# Patient Record
Sex: MALE | Race: WHITE | NOT HISPANIC OR LATINO | Employment: OTHER | URBAN - METROPOLITAN AREA
[De-identification: names, ages, dates, MRNs, and addresses within clinical notes are randomized per-mention and may not be internally consistent; named-entity substitution may affect disease eponyms.]

---

## 2024-06-14 ENCOUNTER — APPOINTMENT (OUTPATIENT)
Dept: PRIMARY CARE | Facility: CLINIC | Age: 89
End: 2024-06-14
Payer: MEDICARE

## 2024-06-14 VITALS — SYSTOLIC BLOOD PRESSURE: 135 MMHG | HEART RATE: 94 BPM | RESPIRATION RATE: 16 BRPM | DIASTOLIC BLOOD PRESSURE: 77 MMHG

## 2024-06-14 DIAGNOSIS — Z23 NEED FOR VACCINATION WITH 20-POLYVALENT PNEUMOCOCCAL CONJUGATE VACCINE: ICD-10-CM

## 2024-06-14 DIAGNOSIS — I10 HYPERTENSION, UNSPECIFIED TYPE: ICD-10-CM

## 2024-06-14 DIAGNOSIS — I50.9 HEART FAILURE, UNSPECIFIED HF CHRONICITY, UNSPECIFIED HEART FAILURE TYPE (MULTI): ICD-10-CM

## 2024-06-14 DIAGNOSIS — Z00.00 ENCOUNTER FOR ANNUAL WELLNESS VISIT (AWV) IN MEDICARE PATIENT: Primary | ICD-10-CM

## 2024-06-14 DIAGNOSIS — I48.0 PAROXYSMAL ATRIAL FIBRILLATION (MULTI): ICD-10-CM

## 2024-06-14 DIAGNOSIS — E78.5 HYPERLIPIDEMIA, UNSPECIFIED HYPERLIPIDEMIA TYPE: ICD-10-CM

## 2024-06-14 RX ORDER — FUROSEMIDE 20 MG/1
20 TABLET ORAL
COMMUNITY
Start: 2023-11-01

## 2024-06-14 RX ORDER — TRAZODONE HYDROCHLORIDE 50 MG/1
50 TABLET ORAL
COMMUNITY
Start: 2023-09-15 | End: 2024-06-15 | Stop reason: WASHOUT

## 2024-06-14 RX ORDER — DOXAZOSIN 4 MG/1
4 TABLET ORAL
COMMUNITY
Start: 2023-05-20

## 2024-06-14 RX ORDER — SIMVASTATIN 20 MG/1
20 TABLET, FILM COATED ORAL
COMMUNITY
Start: 2023-08-10 | End: 2024-06-15 | Stop reason: WASHOUT

## 2024-06-14 RX ORDER — GUAIFENESIN 400 MG/1
400 TABLET ORAL EVERY 6 HOURS PRN
COMMUNITY
Start: 2023-04-04

## 2024-06-14 RX ORDER — ATENOLOL 50 MG/1
50 TABLET ORAL
COMMUNITY
Start: 2023-08-11

## 2024-06-14 RX ORDER — DIGOXIN 125 MCG
0.12 TABLET ORAL
COMMUNITY
Start: 2023-11-01

## 2024-06-14 ASSESSMENT — PATIENT HEALTH QUESTIONNAIRE - PHQ9
1. LITTLE INTEREST OR PLEASURE IN DOING THINGS: NOT AT ALL
2. FEELING DOWN, DEPRESSED OR HOPELESS: NOT AT ALL
SUM OF ALL RESPONSES TO PHQ9 QUESTIONS 1 AND 2: 0

## 2024-06-14 ASSESSMENT — ENCOUNTER SYMPTOMS
OCCASIONAL FEELINGS OF UNSTEADINESS: 0
LOSS OF SENSATION IN FEET: 0
DEPRESSION: 0

## 2024-06-14 ASSESSMENT — ACTIVITIES OF DAILY LIVING (ADL)
DOING_HOUSEWORK: NEEDS ASSISTANCE
DRESSING: NEEDS ASSISTANCE
MANAGING_FINANCES: INDEPENDENT
GROCERY_SHOPPING: NEEDS ASSISTANCE
TAKING_MEDICATION: NEEDS ASSISTANCE
BATHING: NEEDS ASSISTANCE

## 2024-06-14 NOTE — PROGRESS NOTES
Subjective   Patient ID: Dhruv Valdes is a 89 y.o. male who presents for Medicare Annual Wellness Visit Subsequent (ASKING FOR IMMUNIZATIONS ).  [place]   [facility]   Presenting today for AWV   He is in a wheel chair and was brought in by [facility]    He reported that he recently had comprehensive blood workup at the [facility].   [facility] .     Relocated to the [facility] and has been followed by a provider over there.     He had an admission with mentione of Pafib that was back in 09/2023 and since then he has been following with another provider at the [facility] , we do not have documentation and she was seen by primary care with [facility] [place] since then.   There were also mention of heart failure, no documents available.     He is feeling well.   His borther is his emergency contact.       patient is feeling well   he is not worried about much   lives alone   no family around   he is a reader , write letters   have exercise that he tries to follow regulary   he eats healthy   he is DNR , when time comes he wants natural death.         Review of Systems    History reviewed. No pertinent past medical history.    History reviewed. No pertinent surgical history.   Social History     Socioeconomic History    Marital status:      Spouse name: None    Number of children: None    Years of education: None    Highest education level: None   Occupational History    None   Tobacco Use    Smoking status: Former     Types: Cigarettes    Smokeless tobacco: Never   Substance and Sexual Activity    Alcohol use: Not Currently    Drug use: Not Currently    Sexual activity: None   Other Topics Concern    None   Social History Narrative    None     Social Determinants of Health     Financial Resource Strain: Not on file   Food Insecurity: Not on file   Transportation Needs: Not on file   Physical Activity: Not  on file   Stress: Not on file   Social Connections: Not on file   Intimate Partner Violence: Not on file   Housing Stability: Not on file      Family History   Problem Relation Name Age of Onset    No Known Problems Mother      No Known Problems Father         MEDICATIONS AND ALLERGIES:    ALLERGIES Patient has no known allergies.    MEDICATIONS   Current Outpatient Medications on File Prior to Visit   Medication Sig Dispense Refill    atenolol (Tenormin) 50 mg tablet Take 1 tablet (50 mg) by mouth once daily.      digoxin (Lanoxin) 125 MCG tablet Take 1 tablet (0.125 mg) by mouth once daily.      doxazosin (Cardura) 4 mg tablet Take 1 tablet (4 mg) by mouth once daily.      furosemide (Lasix) 20 mg tablet Take 1 tablet (20 mg) by mouth once daily.      guaiFENesin (Humibid 3) 400 mg tablet Take 1 tablet (400 mg) by mouth every 6 hours if needed.      simvastatin (Zocor) 20 mg tablet Take 1 tablet (20 mg) by mouth early in the morning..      traZODone (Desyrel) 50 mg tablet Take 1 tablet (50 mg) by mouth.       No current facility-administered medications on file prior to visit.              Objective   Visit Vitals  /77   Pulse 94   Resp 16   Smoking Status Former          6/14/2024     9:48 AM   Vitals   Systolic 135   Diastolic 77   Heart Rate 94   Resp 16   Visit Report Report     Physical Exam  Constitutional:       General: He is not in acute distress.  HENT:      Head: Normocephalic.      Right Ear: Tympanic membrane normal.      Left Ear: Tympanic membrane normal.      Nose: Nose normal.      Mouth/Throat:      Mouth: Mucous membranes are moist.   Eyes:      General:         Right eye: No discharge.         Left eye: No discharge.      Pupils: Pupils are equal, round, and reactive to light.   Cardiovascular:      Rate and Rhythm: Normal rate and regular rhythm.   Pulmonary:      Effort: Pulmonary effort is normal. No respiratory distress.      Breath sounds: Normal breath sounds. No stridor.    Abdominal:      General: There is no distension.      Palpations: Abdomen is soft.   Musculoskeletal:         General: No swelling.      Cervical back: No rigidity.   Skin:     Coloration: Skin is not jaundiced.   Neurological:      General: No focal deficit present.      Mental Status: He is alert. Mental status is at baseline.      Cranial Nerves: No cranial nerve deficit.   Psychiatric:         Mood and Affect: Mood normal.         Behavior: Behavior normal.         1. Encounter for annual wellness visit (AWV) in Medicare patient  Risk Factors Identified During Visit: frail elderly , multiple medication concerns  Influenza:    Pneumovax 23:  Prevnar: PCV20 given today   Shingles Vaccine: needs second dose.   Prostate cancer screening: BPH 91 , no symptoms , no indication to repeat  Colorectal Cancer Screening: does not want furhter testing  Abdominal Aortic Aneurysm screening: never smoked  Code status :  DNR , patient's brother is his health care proxy     Advised to follow up care with the provider that he sees at the assited living.     2. Paroxysmal atrial fibrillation (Multi)  From char review, Not on anticoagulation   Last hospitalization 09/2023 , since then has been following with another provider , will request hospital records.     3. Need for vaccination with 20-polyvalent pneumococcal conjugate vaccine    - Pneumococcal conjugate vaccine, 20-valent (PREVNAR 20)    4. Heart failure, unspecified HF chronicity, unspecified heart failure type (Multi)  Seems to be stable, will request hospital records.     5. Hypertension, unspecified type  Stable     6. Hyperlipidemia, unspecified hyperlipidemia type  Will request labs       James would benefit from following with the provider that he sees at the [facility].

## 2024-06-14 NOTE — PATIENT INSTRUCTIONS
Patient needs :   RSV vaccine  COVID19 as indicated   2nd shingles vaccine   And influenza vaccine this fall.

## 2024-06-19 ENCOUNTER — TELEPHONE (OUTPATIENT)
Dept: PRIMARY CARE | Facility: CLINIC | Age: 89
End: 2024-06-19
Payer: MEDICARE

## 2024-06-19 NOTE — TELEPHONE ENCOUNTER
----- Message from Eduardo Espinosa MD sent at 6/15/2024  9:20 AM EDT -----  It seems that the [facility] follows with a provider at his assited living , can we call the assisted living and ask if they can be the primary care that would make it easier for the patient and the assisted living, when he came for the follow up , we did not have any documents. We did not get the discharge summary from his last hospital stay and we have not been filling his medications.

## 2024-06-19 NOTE — TELEPHONE ENCOUNTER
[facility] the [phone] and spoke to his nurse, Gina.   She said he is seen by Georgie Jacobo NP and that he should not be leaving the facility for doctors appointments.  She is going to talk to him and explain that he can not do that.  I will change his PCP in Epic to Riki Jacobo NP